# Patient Record
Sex: FEMALE | Race: WHITE
[De-identification: names, ages, dates, MRNs, and addresses within clinical notes are randomized per-mention and may not be internally consistent; named-entity substitution may affect disease eponyms.]

---

## 2017-08-27 ENCOUNTER — HOSPITAL ENCOUNTER (EMERGENCY)
Dept: HOSPITAL 31 - C.ER | Age: 37
Discharge: HOME | End: 2017-08-27
Payer: COMMERCIAL

## 2017-08-27 VITALS
RESPIRATION RATE: 18 BRPM | SYSTOLIC BLOOD PRESSURE: 131 MMHG | HEART RATE: 75 BPM | OXYGEN SATURATION: 100 % | DIASTOLIC BLOOD PRESSURE: 83 MMHG

## 2017-08-27 VITALS — BODY MASS INDEX: 40.7 KG/M2

## 2017-08-27 VITALS — TEMPERATURE: 98.1 F

## 2017-08-27 DIAGNOSIS — R51: Primary | ICD-10-CM

## 2017-08-27 LAB
ALBUMIN/GLOB SERPL: 1 {RATIO} (ref 1–2.1)
ALP SERPL-CCNC: 104 U/L (ref 38–126)
ALT SERPL-CCNC: 30 U/L (ref 9–52)
AST SERPL-CCNC: 30 U/L (ref 14–36)
BASOPHILS # BLD AUTO: 0.1 K/UL (ref 0–0.2)
BASOPHILS NFR BLD: 1 % (ref 0–2)
BILIRUB SERPL-MCNC: 0.7 MG/DL (ref 0.2–1.3)
BILIRUB UR-MCNC: NEGATIVE MG/DL
BUN SERPL-MCNC: 10 MG/DL (ref 7–17)
CALCIUM SERPL-MCNC: 8.8 MG/DL (ref 8.6–10.4)
CHLORIDE SERPL-SCNC: 103 MMOL/L (ref 98–107)
CO2 SERPL-SCNC: 22 MMOL/L (ref 22–30)
EOSINOPHIL # BLD AUTO: 0.1 K/UL (ref 0–0.7)
EOSINOPHIL NFR BLD: 0.8 % (ref 0–4)
ERYTHROCYTE [DISTWIDTH] IN BLOOD BY AUTOMATED COUNT: 16.3 % (ref 11.5–14.5)
GLOBULIN SER-MCNC: 4 GM/DL (ref 2.2–3.9)
GLUCOSE SERPL-MCNC: 75 MG/DL (ref 65–105)
GLUCOSE UR STRIP-MCNC: NORMAL MG/DL
HCT VFR BLD CALC: 35.9 % (ref 34–47)
KETONES UR STRIP-MCNC: NEGATIVE MG/DL
LEUKOCYTE ESTERASE UR-ACNC: (no result) LEU/UL
LYMPHOCYTES # BLD AUTO: 3.9 K/UL (ref 1–4.3)
LYMPHOCYTES NFR BLD AUTO: 28.2 % (ref 20–40)
MCH RBC QN AUTO: 26.6 PG (ref 27–31)
MCHC RBC AUTO-ENTMCNC: 32.8 G/DL (ref 33–37)
MCV RBC AUTO: 81.1 FL (ref 81–99)
MONOCYTES # BLD: 1 K/UL (ref 0–0.8)
MONOCYTES NFR BLD: 6.8 % (ref 0–10)
NRBC BLD AUTO-RTO: 0 % (ref 0–2)
PH UR STRIP: 5 [PH] (ref 5–8)
PLATELET # BLD: 378 K/UL (ref 130–400)
PMV BLD AUTO: 8.1 FL (ref 7.2–11.7)
POTASSIUM SERPL-SCNC: 4.6 MMOL/L (ref 3.6–5.2)
PROT SERPL-MCNC: 7.8 G/DL (ref 6.3–8.3)
PROT UR STRIP-MCNC: NEGATIVE MG/DL
RBC # UR STRIP: (no result) /UL
SODIUM SERPL-SCNC: 137 MMOL/L (ref 132–148)
SP GR UR STRIP: 1.02 (ref 1–1.03)
UROBILINOGEN UR-MCNC: NORMAL MG/DL (ref 0.2–1)
WBC # BLD AUTO: 14 K/UL (ref 4.8–10.8)
WBC #/AREA URNS HPF: 1 /HPF (ref 0–5)

## 2017-08-27 PROCEDURE — 81001 URINALYSIS AUTO W/SCOPE: CPT

## 2017-08-27 PROCEDURE — 85025 COMPLETE CBC W/AUTO DIFF WBC: CPT

## 2017-08-27 PROCEDURE — 96374 THER/PROPH/DIAG INJ IV PUSH: CPT

## 2017-08-27 PROCEDURE — 82553 CREATINE MB FRACTION: CPT

## 2017-08-27 PROCEDURE — 96361 HYDRATE IV INFUSION ADD-ON: CPT

## 2017-08-27 PROCEDURE — 80053 COMPREHEN METABOLIC PANEL: CPT

## 2017-08-27 PROCEDURE — 84484 ASSAY OF TROPONIN QUANT: CPT

## 2017-08-27 PROCEDURE — 84703 CHORIONIC GONADOTROPIN ASSAY: CPT

## 2017-08-27 PROCEDURE — 71020: CPT

## 2017-08-27 PROCEDURE — 96375 TX/PRO/DX INJ NEW DRUG ADDON: CPT

## 2017-08-27 PROCEDURE — 99285 EMERGENCY DEPT VISIT HI MDM: CPT

## 2017-08-27 PROCEDURE — 82550 ASSAY OF CK (CPK): CPT

## 2017-08-27 NOTE — C.PDOC
History Of Present Illness


2017


Tatiana Souza is a 37 y/o female, whose past medical history includes gastritis

, presents to the ED complaining of left sided head pain for one week that has 

been intermittent. Patient reports that she has light sensitivity and dizziness 

that is worse with movement. In addition patient says she has numbness on her 

arms. Patient denies fever, chills, cough, nausea, vomiting, diarrhea, or 

abdominal pain. Patient also reports mild chest discomfort on the left side 

that began yesterday morning. 


 


Time Seen by Provider: 17 15:06


Chief Complaint (Nursing): Headache


History Per: Patient


History/Exam Limitations: no limitations


Onset/Duration Of Symptoms: Days (7 days), Intermittent Episodes


Current Symptoms Are (Timing): Still Present


Preceeding Symptoms: Visual Disturbances





Past Medical History


Reviewed: Historical Data, Nursing Documentation, Vital Signs


Vital Signs: 


 Last Vital Signs











Temp  98.1 F   17 14:59


 


Pulse  80   17 14:59


 


Resp  20   17 14:59


 


BP  112/77   17 14:59


 


Pulse Ox  96   17 15:41














- Medical History


PMH: Gastritis





- [facility] Procedures








LOW CERVICAL  (05/15/15)








Family History: States: Unknown Family Hx





- Social History


Hx Alcohol Use: No


Hx Substance Use: No





- Immunization History


Hx Tetanus Toxoid Vaccination: Yes


Hx Influenza Vaccination: No


Hx Pneumococcal Vaccination: No





Review Of Systems


Constitutional: Negative for: Fever


Cardiovascular: Positive for: Chest Pain (mild chest discomfort on left side)


Gastrointestinal: Negative for: Nausea, Vomiting, Abdominal Pain


Genitourinary: Negative for: Dysuria, Frequency


Neurological: Positive for: Headache (left sided head pain), Dizziness





Physical Exam





- Physical Exam


Additional Physical Exam Comments: 





Constitutional: No acute distress.


Head: Normocephalic.  Atraumatic.  


Eyes:  PERRL. No Nystagmus.


ENT:  Moist mucous membranes.


Neck:  Supple.


Cardiovascular:  Regular rate. Radial pulses 2+ bilaterally.


Chest: No tenderness.


Respiratory:  Clear to auscultation bilaterally.


GI:  Soft. Nontender. Nondistended. 


Back:  No CVA tenderness.


Musculoskeletal:  No tenderness or swelling of extremities.


Skin:  No rash. 


Neurologic:  Alert, no focal deficit. Oriented x 3. Cranial nerves II-XII 

intact. Sensation to light touch intact bilaterally. Motor 5/5 x 4. Gait 

normal. Romberg negative. Finger to nose, heel to shin, and rapid alternating 

movements normal. kernig and brudzinski negative. Negative Huntley-Hallpike. 








ED Course And Treatment





- Laboratory Results


Result Diagrams: 


 17 15:53





 17 15:53


O2 Sat by Pulse Oximetry: 96 (air room)


Pulse Ox Interpretation: Normal





Medical Decision Making


Medical Decision Makin2017


Impression:


37 y/o female with left sided head pain and dizziness. 





Plan:


-- CXR


-- Labs


-- Urinalysis


-- Antivert, Reglan, Sodium Chloride, Toradol, and Tylenol 


-- Reassess and disposition





Patient states she feels much better after 2.5 hours in ED and would like to go 

home. Will discharge, instructed to f/u with PMD, instructed to return to the 

ED for worsening pain, fever, stiff neck, intractible vomiting, or any other 

problem.





Disposition





- Disposition


Referrals: 


Shaik Gil MD [Staff Provider] - 


Disposition: HOME/ ROUTINE


Disposition Time: 17:19


Condition: STABLE


Prescriptions: 


Famotidine [Pepcid] 1 tab PO BID #14 tab


Ibuprofen [Motrin] 600 mg PO Q6 #25 tab


Ondansetron ODT [Zofran ODT] 4 mg PO Q8 #12 odt


Instructions:  Acute Headache (ED)


Forms:  [facility] (English)





- Clinical Impression


Clinical Impression: 


 Headache








- Scribe Statement


The provider has reviewed the documentation as recorded by the Scribe


2017


Scribe Attestation:


Sobeida Mota MD Scribe Attestation:


All medical record entries made by the Scribe were at my direction and 

personally dictated by me. I have reviewed the chart and agree that the record 

accurately reflects my personal performance of the history, physical exam, 

medical decision making, and the department course for this patient. I have 

also personally directed, reviewed, and agree with the discharge instructions 

and disposition.

## 2018-09-03 ENCOUNTER — HOSPITAL ENCOUNTER (EMERGENCY)
Dept: HOSPITAL 31 - C.ER | Age: 38
Discharge: HOME | End: 2018-09-03
Payer: MEDICAID

## 2018-09-03 VITALS — OXYGEN SATURATION: 99 %

## 2018-09-03 VITALS — TEMPERATURE: 98.9 F

## 2018-09-03 VITALS — DIASTOLIC BLOOD PRESSURE: 69 MMHG | HEART RATE: 65 BPM | RESPIRATION RATE: 16 BRPM | SYSTOLIC BLOOD PRESSURE: 108 MMHG

## 2018-09-03 VITALS — BODY MASS INDEX: 36.9 KG/M2

## 2018-09-03 DIAGNOSIS — Z3A.08: ICD-10-CM

## 2018-09-03 DIAGNOSIS — O20.0: Primary | ICD-10-CM

## 2018-09-03 LAB
ALBUMIN SERPL-MCNC: 3.5 G/DL (ref 3.5–5)
ALBUMIN/GLOB SERPL: 1.2 {RATIO} (ref 1–2.1)
ALT SERPL-CCNC: 32 U/L (ref 9–52)
AST SERPL-CCNC: 12 U/L (ref 14–36)
BASOPHILS # BLD AUTO: 0.1 K/UL (ref 0–0.2)
BASOPHILS NFR BLD: 0.8 % (ref 0–2)
BILIRUB UR-MCNC: NEGATIVE MG/DL
BUN SERPL-MCNC: 8 MG/DL (ref 7–17)
CALCIUM SERPL-MCNC: 9.1 MG/DL (ref 8.6–10.4)
EOSINOPHIL # BLD AUTO: 0 K/UL (ref 0–0.7)
EOSINOPHIL NFR BLD: 0.2 % (ref 0–4)
ERYTHROCYTE [DISTWIDTH] IN BLOOD BY AUTOMATED COUNT: 17.2 % (ref 11.5–14.5)
GFR NON-AFRICAN AMERICAN: > 60
GLUCOSE UR STRIP-MCNC: NORMAL MG/DL
HCG,QUALITATIVE URINE: POSITIVE
HGB BLD-MCNC: 10.9 G/DL (ref 11–16)
LEUKOCYTE ESTERASE UR-ACNC: (no result) LEU/UL
LYMPHOCYTES # BLD AUTO: 2 K/UL (ref 1–4.3)
LYMPHOCYTES NFR BLD AUTO: 21.3 % (ref 20–40)
MCH RBC QN AUTO: 27.9 PG (ref 27–31)
MCHC RBC AUTO-ENTMCNC: 33.7 G/DL (ref 33–37)
MCV RBC AUTO: 82.9 FL (ref 81–99)
MONOCYTES # BLD: 0.6 K/UL (ref 0–0.8)
MONOCYTES NFR BLD: 6.5 % (ref 0–10)
NEUTROPHILS # BLD: 6.7 K/UL (ref 1.8–7)
NEUTROPHILS NFR BLD AUTO: 71.2 % (ref 50–75)
NRBC BLD AUTO-RTO: 0 % (ref 0–2)
PH UR STRIP: 5 [PH] (ref 5–8)
PLATELET # BLD: 303 K/UL (ref 130–400)
PMV BLD AUTO: 8.6 FL (ref 7.2–11.7)
PROT UR STRIP-MCNC: NEGATIVE MG/DL
RBC # BLD AUTO: 3.91 MIL/UL (ref 3.8–5.2)
RBC # UR STRIP: NEGATIVE /UL
SP GR UR STRIP: 1.02 (ref 1–1.03)
SQUAMOUS EPITHIAL: 4 /HPF (ref 0–5)
UROBILINOGEN UR-MCNC: NORMAL MG/DL (ref 0.2–1)
WBC # BLD AUTO: 9.5 K/UL (ref 4.8–10.8)

## 2018-09-03 NOTE — C.PDOC
History Of Present Illness


36 y/o  female presents to the ED complaining of left-sided abdominal pain 

since last night. Associated with dysuria. Patient states last week she had a 

positive home pregnancy test, but has not had any prenatal care or seen an OB 

yet. LMP was 18. Otherwise she denies any vomiting, diarrhea, vaginal 

bleeding, discharge, fever, or chills. Prior surgical history is significant 

for  x2 and bariatric surgery a few months ago. 





Time Seen by Provider: 18 12:16


Chief Complaint (Nursing): Female Genitourinary


History Per: Patient


History/Exam Limitations: no limitations


Onset/Duration Of Symptoms: Days


Current Symptoms Are (Timing): Still Present


Associated Symptoms: Urinary Symptoms


Abnormal Vaginal Bleeding: No


: 3


Para: 2


Miscarriage: 0





Past Medical History


Reviewed: Historical Data, Nursing Documentation, Vital Signs


Vital Signs: 


 Last Vital Signs











Temp  98.9 F   18 15:14


 


Pulse  65   18 15:14


 


Resp  16   18 15:14


 


BP  108/69   18 15:14


 


Pulse Ox  99   18 15:27














- Medical History


PMH: Gastritis


Surgical History: 


Other Surgeries: Bariatric surgery





- [facility] Procedures








LOW CERVICAL  (05/15/15)








Family History: States: Unknown Family Hx





- Social History


Hx Alcohol Use: No


Hx Substance Use: No





- Immunization History


Hx Tetanus Toxoid Vaccination: Yes


Hx Influenza Vaccination: No


Hx Pneumococcal Vaccination: No





Review Of Systems


Except As Marked, All Systems Reviewed And Found Negative.


Constitutional: Negative for: Fever, Chills


Eyes: Negative for: Vision Change


Cardiovascular: Negative for: Chest Pain


Respiratory: Negative for: Shortness of Breath


Gastrointestinal: Positive for: Abdominal Pain.  Negative for: Vomiting, 

Diarrhea


Genitourinary: Positive for: Dysuria.  Negative for: Vaginal Discharge, Vaginal 

Bleeding


Musculoskeletal: Negative for: Back Pain


Neurological: Negative for: Weakness, Headache, Dizziness





Physical Exam





- Physical Exam


Appears: Non-toxic, No Acute Distress


Skin: Normal Color, Warm, Dry


Head: Atraumatic, Normacephalic


Eye(s): bilateral: Normal Inspection, PERRL, EOMI


Nose: Normal


Oral Mucosa: Moist


Neck: Normal ROM


Chest: Symmetrical


Cardiovascular: Rhythm Regular, No Murmur


Respiratory: Normal Breath Sounds, No Rhonchi, No Wheezing, Other (NARD)


Gastrointestinal/Abdominal: Soft, Tenderness (mild left pelvic and LLQ 

tenderness), No Distention, No Guarding


Back: Normal Inspection, No CVA Tenderness, No Vertebral Tenderness


Extremity: Bilateral: Atraumatic, Normal Color And Temperature, Normal ROM


Neurological/Psych: Oriented x3, Normal Speech





ED Course And Treatment





- Laboratory Results


Result Diagrams: 


 18 12:47





 18 12:47


O2 Sat by Pulse Oximetry: 99 (RA)


Pulse Ox Interpretation: Normal





- CT Scan/US


  ** OB Ultrasound


Other Rad Studies (CT/US): Read By Radiologist, Radiology Report Reviewed


CT/US Interpretation: Accession No. : [id number].  Patient Name / ID : 

BHAKTI LAO  / [id number].  Exam Date : 2018 13:15:23 ( Approved ).  

Study Comment :  Sex / Age : F  / 37Y.  Creator : Ryder Damon MD.  

Dictator :   :  Approver : Ryder Damon MD.  Approver2 :  

Report Date : 2018 14:57:52.  My Comment :  ******************************

*****************************************************.  Date of service:  2018.  PROCEDURE:  OB Pelvic Ultrasound.  HISTORY:  Left pelvic pain.  LMP: .  COMPARISON:  Comparison made with prior pelvic ultrasound 2016.

  FINDINGS:  UTERUS:  Gestational sac: MSD = 3.1 cm = 8 weeks 1 day.  Fetal pole

: CRL = 1.56 cm = 8 weeks 0 day.  Yolk sac: ? 0.46 cm.  Fetal Heart rate: 172 

bpm.  Fetal age (Ultrasound estimated): 8 weeks 1 day 0 weeks 4 days.  Cassy-

gestational hemorrhage: None.  Date of delivery (Ultrasound estimated) : 2019.  Uterus measures 10.6 x 7.4 x 7.8  cm. Normal in size and appearance. 

Retroverted. Small anterior lower uterine body fibroid measuring 2.6 x 1.8 x 

2.6 cm.  CERVIX:  Measures 4.58 cm. Long and closed. No cervical abnormality 

seen.  RIGHT OVARY:  Measures 4.0 x 4.4 x 4.0 cm. No mass lesion. Normal flow. 

Small cyst measuring 2.1 x 2.5 x 2.2 cm.  LEFT OVARY:  Not visualized.  FREE 

FLUID:  .No free fluid.  OTHER FINDINGS:  None.  IMPRESSION:  Single living 

intrauterine gestation estimated approximately 8 weeks 4 day 0 weeks 1 day. 

Heart rate documented at 172 BPM.  Small anterior uterine fibroid.  Small right 

ovarian cyst left ovary not visualized the





Medical Decision Making


Medical Decision Making: 





Impression: Abdominal pain during pregnancy, rule out ectopic pregnancy





Initial Plan:


--Blood work with Beta quant


--UA


--Urine HCG


--Pregnancy/OB US





Patient advised that the focus of ER evaluation is to rule out ectopic. If 

patient continues to have persistent symptoms, she needs to follow up with GYN.

  





Labs reviewed: Beta-HCG 08503.00, UA is negative.





Ultrasound results discussed w/ patient. Provided with copy of report. 








Disposition


Counseled Patient/Family Regarding: Studies Performed, Diagnosis, Need For 

Followup





- Disposition


Referrals: 


[facility] [Other]


 Service [Outside]


[facility] [Outside]


Disposition: HOME/ ROUTINE


Disposition Time: 15:20


Condition: GOOD


Instructions:  Threatened Miscarriage (DC)


Forms:  [facility] (English)


Print Language: Croatian





- Clinical Impression


Clinical Impression: 


 Threatened miscarriage








- Scribe Statement


The provider has reviewed the documentation as recorded by the Adan Palomo





Provider Attestation: 





All medical record entries made by the Adan were at my direction and 

personally dictated by me. I have reviewed the chart and agree that the record 

accurately reflects my personal performance of the history, physical exam, 

medical decision making, and the department course for this patient. I have 

also personally directed, reviewed, and agree with the discharge instructions 

and disposition.

## 2018-09-03 NOTE — US
Date of service: 



09/03/2018



PROCEDURE:  OB Pelvic Ultrasound



HISTORY:

Left pelvic pain



LMP: 07/18/2018



COMPARISON:

Comparison made with prior pelvic ultrasound 05/27/2016.



FINDINGS:



UTERUS:

Gestational sac: MSD = 3.1 cm = 8 weeks 1 day. 



Fetal pole: CRL = 1.56 cm = 8 weeks 0 day 



Yolk sac: ? 0.46 cm 



Fetal Heart rate: 172 bpm.



Fetal age (Ultrasound estimated): 8 weeks 1 day 0 weeks 4 days



Cassy-gestational hemorrhage: None.



Date of delivery (Ultrasound estimated) : 04/14/2019



Uterus measures 10.6 x 7.4 x 7.8  cm. Normal in size and appearance. 

Retroverted. Small anterior lower uterine body fibroid measuring 2.6 

x 1.8 x 2.6 cm 



CERVIX:

Measures 4.58 cm. Long and closed. No cervical abnormality seen.



RIGHT OVARY:

Measures 4.0 x 4.4 x 4.0 cm. No mass lesion. Normal flow. Small cyst 

measuring 2.1 x 2.5 x 2.2 cm 



LEFT OVARY:

Not visualized 



FREE FLUID:

.No free fluid.



OTHER FINDINGS:

None. 



IMPRESSION:

Single living intrauterine gestation estimated approximately 8 weeks 

4 day 0 weeks 1 day. Heart rate documented at 172 BPM.



Small anterior uterine fibroid.



Small right ovarian cyst left ovary not visualized the

## 2019-04-02 ENCOUNTER — HOSPITAL ENCOUNTER (INPATIENT)
Dept: HOSPITAL 31 - C.4D | Age: 39
LOS: 3 days | Discharge: HOME | DRG: 371 | End: 2019-04-05
Attending: OBSTETRICS & GYNECOLOGY | Admitting: OBSTETRICS & GYNECOLOGY
Payer: MEDICAID

## 2019-04-02 VITALS — BODY MASS INDEX: 35.1 KG/M2

## 2019-04-02 DIAGNOSIS — Z30.2: ICD-10-CM

## 2019-04-02 DIAGNOSIS — Z3A.39: ICD-10-CM

## 2019-04-02 DIAGNOSIS — O34.211: Primary | ICD-10-CM

## 2019-04-02 LAB
ALBUMIN SERPL-MCNC: 3.5 G/DL (ref 3.5–5)
ALBUMIN/GLOB SERPL: 1.2 {RATIO} (ref 1–2.1)
ALT SERPL-CCNC: < 6 U/L (ref 9–52)
AST SERPL-CCNC: 16 U/L (ref 14–36)
BACTERIA #/AREA URNS HPF: (no result) /[HPF]
BASOPHILS # BLD AUTO: 0 K/UL (ref 0–0.2)
BASOPHILS NFR BLD: 0.4 % (ref 0–2)
BILIRUB UR-MCNC: NEGATIVE MG/DL
BUN SERPL-MCNC: 7 MG/DL (ref 7–17)
CALCIUM SERPL-MCNC: 8.8 MG/DL (ref 8.6–10.4)
EOSINOPHIL # BLD AUTO: 0.1 K/UL (ref 0–0.7)
EOSINOPHIL NFR BLD: 0.5 % (ref 0–4)
ERYTHROCYTE [DISTWIDTH] IN BLOOD BY AUTOMATED COUNT: 16.7 % (ref 11.5–14.5)
GFR NON-AFRICAN AMERICAN: > 60
GLUCOSE UR STRIP-MCNC: NORMAL MG/DL
HGB BLD-MCNC: 11.2 G/DL (ref 11–16)
LEUKOCYTE ESTERASE UR-ACNC: (no result) LEU/UL
LYMPHOCYTES # BLD AUTO: 2.8 K/UL (ref 1–4.3)
LYMPHOCYTES NFR BLD AUTO: 24.1 % (ref 20–40)
MCH RBC QN AUTO: 27.5 PG (ref 27–31)
MCHC RBC AUTO-ENTMCNC: 31.8 G/DL (ref 33–37)
MCV RBC AUTO: 86.5 FL (ref 81–99)
MONOCYTES # BLD: 0.6 K/UL (ref 0–0.8)
MONOCYTES NFR BLD: 5.5 % (ref 0–10)
NEUTROPHILS # BLD: 8.1 K/UL (ref 1.8–7)
NEUTROPHILS NFR BLD AUTO: 69.5 % (ref 50–75)
NRBC BLD AUTO-RTO: 0 % (ref 0–2)
PH UR STRIP: 5 [PH] (ref 5–8)
PLATELET # BLD: 289 K/UL (ref 130–400)
PMV BLD AUTO: 8.4 FL (ref 7.2–11.7)
PROT UR STRIP-MCNC: (no result) MG/DL
RBC # BLD AUTO: 4.07 MIL/UL (ref 3.8–5.2)
RBC # UR STRIP: NEGATIVE /UL
SP GR UR STRIP: 1.03 (ref 1–1.03)
SQUAMOUS EPITHIAL: 27 /HPF (ref 0–5)
UROBILINOGEN UR-MCNC: 2 MG/DL (ref 0.2–1)
WBC # BLD AUTO: 11.7 K/UL (ref 4.8–10.8)

## 2019-04-02 PROCEDURE — 0UB50ZZ EXCISION OF RIGHT FALLOPIAN TUBE, OPEN APPROACH: ICD-10-PCS | Performed by: OBSTETRICS & GYNECOLOGY

## 2019-04-02 NOTE — OBDS
===================================

DELIVERY PERSONNEL

===================================

   

Delivery Doctor:  SAMUEL Vasquez DO

Scrub Nurse:  Robina Becerra

Circulator:  Angie Pritchett RN

Anesthesiologist:  AYALA Mcdonough MD

   

===================================

MATERNAL INFORMATION

===================================

   

Delivery Anesthesia:  Spinal

Medications in Delivery:  pitocin 40units x2 as per MD order

Placenta Cultured:  No

Maternal Complications:  None

Provider Comments:  Repeat LTC  Section with delivery of a viable Male  from CANDIDA posit
ion and after reducing a loose CNC x 1. Apgars 9_9 and BW 8lbs. 

   Right Fallopian Tubal Ligation with EndoSeal. No Left Fallopian tube identified and pt gave Hx of 
left adnexa removed in . 

   Extensive SUMIT required

      

    mls

   Pt and  tolerated the procedure well and both left the OR in Stable and Stisfactory conditi
on.

   Dr. Palomino assisted the entire case from beginning to end. 

   Dr Maki, Pediatrician attended the 

      

   

===================================

LABOR SUMMARY

===================================

   

EDC:  2019 00:00

No. Babies in Womb:  1

 Attempted:  No

Labor Anesthesia:  None

   

===================================

LABOR INFORMATION

===================================

   

Reason for Induction:  Not Applicable

Other Ripening Agents:  n/a

Oxytocin:  N/A

Group B Beta Strep:  Negative

Steroids Given:  None

Reason Steroids Not Administered:  Not Applicable

   

===================================

MEMBRANES

===================================

   

Membranes Rupture Method:  Artificial

Rupture of Membranes:  2019 09:38

Length of Rupture (hrs):  0.00

Amniotic Fluid Color:  Clear

Amniotic Fluid Amount:  Moderate

Amniotic Fluid Odor:  Normal

   

===================================

STAGES OF LABOR

===================================

   

Stage 3 hrs:  0

Stage 3 min:  1

   

===================================

CSECTION DELIVERY

===================================

   

Primary Indication:  Repeat Elective

Secondary Indication:  Repeat Elective

CSection Urgency:  Elective

CSection Incidence:  Repeat

Labor:  No Labor

Elective:  Elective

CSection Incision:  Lower Uterine Transverse

   

===================================

BABY A INFORMATION

===================================

   

Infant Delivery Date/Time:  2019 09:38

Method of Delivery:  

Born in Route :  No

:  N/A

Forceps:  N/A

Vacuum Extraction:  N/A

Shoulder Dystocia :  No

   

===================================

SHOULDER DYSTOCIA BABY A

===================================

   

Infant Delivery Date/Time:  2019 09:38

   

===================================

PRESENTATION/POSITION BABY A

===================================

   

Presentation:  Cephalic

Cephalic Presentation:  Vertex

Vertex Position:  Right Occipital Anterior

Breech Presentation:  N/A

   

===================================

PLACENTA INFORMATION BABY A

===================================

   

Placenta Delivery Time :  2019 09:39

Placenta Method of Delivery:  Manual Removal

Placenta Status:  Delivered

   

===================================

APGAR SCORES BABY A

===================================

   

Heart Rate 1 min:  >100 bpm

Resp Effort 1 min:  Good Cry

Reflex Irritability 1 min:  Cough or Sneeze or Pulls Away

Muscle Tone 1 min:  Active Motion

Color 1 min:  Body Pink, Extremities Blue

APGAR SCORE 1 MIN:  9

Heart Rate 5 min:  >100 bpm

Resp Effort 5 min:  Good Cry

Reflex Irritability 5 min:  Cough or Sneeze or Pulls Away

Muscle Tone 5 min:  Active Motion

Color 5 min:  Body Pink, Extremities Blue

APGAR SCORE 5 MIN:  9

   

===================================

INFANT INFORMATION BABY A

===================================

   

Gestational Age at Delivery:  39.2

Gestational Status:  Term

Infant Outcome :  Liveborn

Infant Condition :  Stable

Infant Sex:  Male

   

===================================

IDENTIFICATION/MEDS BABY A

===================================

   

ID Band Number:  [id number]

ID Band Location:  Left Leg; Left Arm



Sensor Applied:  Yes

Sensor Number:  [id number]

Sensor Location :  Cord Clamp

Vitamin K Given :  Left Thigh

Erythromycin Given:  Given Both Eyes

   

===================================

WEIGHT/LENGTH BABY A

===================================

   

Infant Birthweight (gms):  3620

Infant Weight (lb):  8

Infant Weight (oz):  0

Infant Length Inches:  19.00

Infant Length cms:  48.3

   

===================================

CORD INFORMATION BABY A

===================================

   

No. Cord Vessels:  3

Nuchal Cord :  Around Neck x1, Loose

Infant Cord pH Baby Arterial:  7.19

Infant Cord pH Baby Venous:  7.28

Cord Blood Taken:  Yes

Infant Suction:  Mouth; Nose

   

===================================

ASSESSMENT BABY A

===================================

   

Infant Complications:  None

Physical Findings at Delivery:  Within Normal Limits

Infant Respirations:  Appears Normal

Neonatologist/ALS Called :  Yes

Infant Care By:  wilma saravia rn

Transferred To:  Remains with Mother

## 2019-04-02 NOTE — OBADHP
===========================

Datetime: 2019 08:53

===========================

   

Admit Comment, IP Provider:  39 y/o  F at 39.2 weeks gestation presented to  L_D for schedul
ed repeat . Pt has been followed prenatally by [facility]. She has been taking pren
atal vitamins, iron. 

   She reports she had felt baby moving and felt contractions. She reports no acute complaints curren
tly. She is denying fevers, chills, headache, dizziness, chest pain, palpitationns, shortness of geovanni
th, nausea, vomiting, constipation, diarrhea, dysuria, hematuria. Further denies LOF, VB or VD.

      

   OBHx: . 2 previous term pregnancies. 5/15/2015: 7.5lb F delivered via primary C/S @ 38wks. 
6/3/2007: 7 lb boy delivered at 40wks via repeat C/S. First baby delivered via C/S d/t arrest of dila
tion. Both children delivered at [facility]

      

   GynHx: Menarche: 13 y/o. LMP 18. LUC: 19. Regular periods every 30 days lasting 4-5 days,
 using about 10-12 pads. 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 Reports p
revious IUD (doesn't recall which type) that was removed prior to this pregnancy. 

      

   PMH: Previous (+) PPD, CXR negative. 

   All: NKDA

   PSH: 2018: "Gastric bypass", [place]. Reports no complications. : Ovarian lemon-sized
 "myoma" removal, [place], denies complications. 

   FH: Non-contributory

   SH: Denies smoking, ETOH, illicit drug use

   Hosp: denies recent hospitalizations

   Meds: Pre-harini vitamins, pantoprazole 40mg, Ferrous sulfate

   OBGYN: [facility]

   Pediatrician: Dr. Edgar Vasquez. 

      

   Vitals:

   T: BP: 106/78 P:94 SpO2:100%

      

   Physical Exam: As Noted

   Labs: 

   11.7>11.2/35.2<289 (19)

   Utox: (-)

      

   Assessment:

   39 y/o AMA  F at 39 weeks gestation presenting to L_D for repeat  and BTL

      

   Plan:

   -pre-op CBC/CMP/U/A/Utox

   -LR 1L IVF bolus

   -empiric cefoxitin

   -Anesthesia consult

   -NST Reactive

   -obtain consent for C/S _ bilateral tubal ligation. Risks/benefits/alternatives have been explaine
d in detail for both procedures

      

   [place]ian Interpretor # [id number]

      

   Case discussed with Dr. Pedro Argueta PGY1

      

   Pt seen and examined with Dr. Kay and all of his findings and POC were fully discussed and agree
d on.

      

      

      

      

Pelvic Type - PN:  Not Done

Extremities - PN:  Normal

Abdomen - PN:  Normal

Back - PN:  Not Done

Breast - PN:  Not Done

Lungs - PN:  Normal

Heart - PN:  Normal

Thyroid - PN:  Normal

Neurologic - PN:  Normal

HEENT - PN:  Normal

General - PN:  Normal

Fetal Presentation-Admit:  Vertex

FHR - Baseline A Provider:  140

Membranes, Provider:  Intact

Comments, ACOG Physical Exam:  Gen: WDWN, NAD

   Neuro: AxO x 3, No FND

   HEENT: Normocephalic, atraumatic, EOMI

   CV: RRR, S1/S2 present, no m/r/g

   Pulm: CTA b/l, u/l lobes

   Abd: Soft, non-distended, fundal height 38cm above PS. Previous low transverse incision site c/d/i


   EXT: trace non-pitting edema

      

Gestation - Est Wks by US:  39.2

Vital Signs Provider:  Reviewed

IP Chief Complaint:  Scheduled  Section

NICHD Variability Prov Fetus A:  Moderate 6-25bpm

NICHD Accel Fetus A IP Provider:  10X10

FHR Category Provider Fetus A:  Category I

NICHD Decel Fetus A IP Provider:  None

Genitourinary Exam:  Not Done

DTRs - PN:  Not Done

EGA AdmitDate IP:  39.2

IP Adm Impression:  Term, intrauterine pregnancy; No Active Labor; Intact Membranes

IP Admit Plan:  Admit to unit; Initiate  Section protocol

## 2019-04-03 LAB
ERYTHROCYTE [DISTWIDTH] IN BLOOD BY AUTOMATED COUNT: 16.3 % (ref 11.5–14.5)
HGB BLD-MCNC: 9.5 G/DL (ref 11–16)
MCH RBC QN AUTO: 28.2 PG (ref 27–31)
MCHC RBC AUTO-ENTMCNC: 32.6 G/DL (ref 33–37)
MCV RBC AUTO: 86.5 FL (ref 81–99)
PLATELET # BLD: 242 K/UL (ref 130–400)
PMV BLD AUTO: 8.2 FL (ref 7.2–11.7)
RBC # BLD AUTO: 3.37 MIL/UL (ref 3.8–5.2)
WBC # BLD AUTO: 15 K/UL (ref 4.8–10.8)

## 2019-04-03 RX ADMIN — PRENATAL VIT W/ FE FUMARATE-FA TAB 27-0.8 MG SCH TAB: 27-0.8 TAB at 10:04

## 2019-04-03 NOTE — OBPPN
===========================

Datetime: 2019 07:46

===========================

   

PP Pain Prov:  Within normal limits

PP Nausea Prov:  Denies

PP Flatus Prov:  No

PP BM Prov:  No

PP Breasts Prov:  Not Done

PP Heart Prov:  Normal

PP Lungs Prov:  Normal

PP Abdomen/Uterus Prov:  Normal

PP Lochia Prov:  Normal

PP Vulva/Perineum Prov:  Not Done

PP CVA Tenderness Prov:  Not Done

PP Extremities Prov:  Normal

PP C/S Incision Prov:  Normal

PP Breastfeeding Progress Prov:  Normal

PP Comments Phys Exam Prov:  General: AAO X 3, NAD

   Lungs: CTA b/l

   Heart: RRR, normal S1, S2

   Abdomen: Soft, non-tender, non-distended. Fundal height one finger breadth below the umbilicus, fi
rm, and non-tender. Staples in place, dressing C/D/I

   Extremities: No edema or tenderness noted

   Dressing clean, dry and intact

PP Impression Prov:  Normal postpartum progression

PP Plan Prov:  Continue present management

PP Progress Note Prov:  Patient was seen and examined at bedside. No acute events overnight as per pa
norman and nursing staff. Patient is not complaining of any pain, she did not ask for Motrin or Percoc
et overnight. Lochia is minimal, she admits to changing 3 pads yesterday. She is tolerating diet with
out nausea or vomiting. She denies having any bowel movements but admits to flatus. She is ambulating
 from bed to chair and to the bathroom without any issues. Baby is breastfeeding with good latch. She
 denies fevers, chills, dizziness, shortness of breath, chest pain, abdominal pain, or urinary sympto
ms. 

      

      

   VS: T: 97.1, HR: 87, BP: 92/60, O2: 99%

      

   General: AAO X 3, NAD

   Lungs: CTA b/l

   Heart: RRR, normal S1, S2

   Abdomen: Soft, non-tender, non-distended. Fundal height one finger breadth below the umbilicus, fi
rm, and non-tender. Staples in place, dressing C/D/I

   Extremities: No edema or tenderness noted

      

   Assessment: Patient is a 38 year old  female s/p repeat  and BTL POD #1. Patient i
s stable and afebrile.

      

   Plan:

   - Pain controlled with Motrin 

   - Encourage ambulation, hydration, and breastfeeding

   - Incentive spirometer use

   - Diet advanced to regular

   - Stable and Satisfactory condition and recovery

   - Continue routine post-operative care

      

   Patient seen and case discussed with attending, Dr. Vasquez.

   Usama Hardy, PGY-1

      

   Pt seen and examined with Dr. Forrester and all of his findings and POC were fully discussed with him
 and agreed on.

IP PP Procedures:  Tubal Ligation

Vital Signs Provider PP:  Reviewed; Within Normal Limits

## 2019-04-04 RX ADMIN — PRENATAL VIT W/ FE FUMARATE-FA TAB 27-0.8 MG SCH TAB: 27-0.8 TAB at 09:18

## 2019-04-04 NOTE — OBPPN
===========================

Datetime: 2019 07:49

===========================

   

PP Pain Prov:  Within normal limits

PP Nausea Prov:  Denies

PP Flatus Prov:  Yes

PP BM Prov:  Yes

PP Breasts Prov:  Not Done

PP Heart Prov:  Normal

PP Lungs Prov:  Normal

PP Abdomen/Uterus Prov:  Normal

PP Lochia Prov:  Normal

PP Vulva/Perineum Prov:  Not Done

PP CVA Tenderness Prov:  Normal

PP Extremities Prov:  Normal

PP C/S Incision Prov:  Normal

PP Breastfeeding Progress Prov:  Normal

PP Comments Phys Exam Prov:  General: WDWN, NAD

   Neuro: No focal deficit, AAO x3

   Cardio: RRR, normal S1, S2, no murmurs/rubs/gallops

   Pulm: CTA b/l upper/lower lobes

   Abdomen: Soft, non-tender, non-distended. Fundal height one FB below the umbilicus, firm, and non-
tender. Incision site well-approximated with staples. Site C/D/I

   Extremities: trace non-pitting edema 

      

PP Impression Prov:  Normal postpartum progression

PP Plan Prov:  Continue present management

PP Progress Note Prov:  Patient was seen and examined at bedside. No acute events overnight as per pa
norman and nursing staff. Patient is not complaining of any pain currently but reports minimal pain 5/
10 at night controlled with Motrin. Vaginal bleeding is minimal. She is tolerating diet and fluids. S
he reports of having one bowel movement this morning and flatus. She is ambulating from bed to chair,
 to the bathroom, and to the hallway without any problem. Baby is breastfeeding on both breasts with 
good latch. She denies any fevers, chills, headache, dizziness, shortness of breath, chest pain, palp
itations, nausea, vomiting, abdominal pain, dysuria. 

      

      

   VS: T: 97.9, HR: 89, BP: 100/56, O2: 99%

      

   PE: as noted

      

   Labs: 15 > 9.5/29.2 > 242

      

   Assessment:

   -38 year old  AMA female POD2 s/p repeat , unilateral R sided tubal ligation _ ext
ensive lysis of adhesions. 

   -Patient hemodynamically stable, afebrile, progressing appropriately per post-operative care

      

   Plan:

   - Continue iron, prenatal vitamins

   - Continue analgesics

   - Encourage IS, hydration, ambulation, breast feeding

   - Continue to monitor incision site

   - Continue routine post-operative care

   - Plan for discharge on POD3

      

   Case discussed with Dr. Mauricio Argueta PGY1

      

      

   Attending Note: patient seen, evaluated and examined by me with the Resident. I agree with the abo
ve as documented.

      

Vital Signs Provider PP:  Reviewed

## 2019-04-05 VITALS — RESPIRATION RATE: 20 BRPM | OXYGEN SATURATION: 99 %

## 2019-04-05 VITALS — HEART RATE: 79 BPM | DIASTOLIC BLOOD PRESSURE: 67 MMHG | TEMPERATURE: 97.2 F | SYSTOLIC BLOOD PRESSURE: 100 MMHG

## 2019-04-05 RX ADMIN — PRENATAL VIT W/ FE FUMARATE-FA TAB 27-0.8 MG SCH TAB: 27-0.8 TAB at 09:49
